# Patient Record
Sex: FEMALE | Race: WHITE | URBAN - METROPOLITAN AREA
[De-identification: names, ages, dates, MRNs, and addresses within clinical notes are randomized per-mention and may not be internally consistent; named-entity substitution may affect disease eponyms.]

---

## 2017-11-05 ENCOUNTER — ED HISTORICAL/CONVERTED ENCOUNTER (OUTPATIENT)
Dept: OTHER | Age: 42
End: 2017-11-05

## 2018-01-23 ENCOUNTER — OFFICE VISIT (OUTPATIENT)
Dept: ENDOCRINOLOGY | Age: 43
End: 2018-01-23

## 2018-01-23 VITALS — HEIGHT: 58 IN | RESPIRATION RATE: 16 BRPM

## 2018-01-23 DIAGNOSIS — E03.8 SUBCLINICAL HYPOTHYROIDISM: Primary | ICD-10-CM

## 2018-01-23 RX ORDER — CLOBAZAM 10 MG/1
10 TABLET ORAL 2 TIMES DAILY
COMMUNITY

## 2018-01-23 RX ORDER — LEVOTHYROXINE SODIUM 50 UG/1
50 TABLET ORAL
COMMUNITY

## 2018-01-23 RX ORDER — ZONISAMIDE 100 MG/1
100 CAPSULE ORAL DAILY
COMMUNITY

## 2018-01-23 RX ORDER — LACOSAMIDE 100 MG/1
100 TABLET ORAL 2 TIMES DAILY
COMMUNITY

## 2018-01-23 RX ORDER — LACTULOSE 10 G/10G
20 SOLUTION ORAL 2 TIMES DAILY
COMMUNITY

## 2018-01-23 RX ORDER — FLUOXETINE HYDROCHLORIDE 40 MG/1
40 CAPSULE ORAL DAILY
COMMUNITY

## 2018-01-23 RX ORDER — TRAMADOL HYDROCHLORIDE 50 MG/1
50 TABLET ORAL
COMMUNITY

## 2018-01-23 RX ORDER — FEXOFENADINE HCL 60 MG
60 TABLET ORAL 2 TIMES DAILY
COMMUNITY

## 2018-01-23 RX ORDER — DOCUSATE SODIUM 100 MG/1
100 CAPSULE, LIQUID FILLED ORAL 2 TIMES DAILY
COMMUNITY

## 2018-01-23 RX ORDER — DIAZEPAM 10 MG/2ML
10 GEL RECTAL AS NEEDED
COMMUNITY

## 2018-01-23 RX ORDER — FERROUS SULFATE, DRIED 160(50) MG
1 TABLET, EXTENDED RELEASE ORAL 2 TIMES DAILY WITH MEALS
COMMUNITY

## 2018-01-23 RX ORDER — QUETIAPINE FUMARATE 25 MG/1
25 TABLET, FILM COATED ORAL
COMMUNITY

## 2018-01-23 RX ORDER — ERGOCALCIFEROL 1.25 MG/1
50000 CAPSULE ORAL
COMMUNITY

## 2018-01-23 RX ORDER — DIVALPROEX SODIUM 500 MG/1
500 TABLET, EXTENDED RELEASE ORAL
COMMUNITY

## 2018-01-23 RX ORDER — FLUOXETINE 20 MG/1
20 TABLET ORAL DAILY
COMMUNITY

## 2018-01-23 RX ORDER — POLYETHYLENE GLYCOL 3350 17 G/17G
17 POWDER, FOR SOLUTION ORAL DAILY
COMMUNITY

## 2018-01-23 RX ORDER — ACETAMINOPHEN 325 MG/1
TABLET ORAL
COMMUNITY

## 2018-01-23 RX ORDER — DIVALPROEX SODIUM 250 MG/1
250 TABLET, EXTENDED RELEASE ORAL DAILY
COMMUNITY

## 2018-01-23 RX ORDER — QUETIAPINE FUMARATE 50 MG/1
50 TABLET, FILM COATED ORAL
COMMUNITY

## 2018-01-23 RX ORDER — ZIPRASIDONE HYDROCHLORIDE 20 MG/1
20 CAPSULE ORAL AS NEEDED
COMMUNITY

## 2018-01-23 RX ORDER — LEVOCARNITINE 330 MG/1
990 TABLET ORAL 3 TIMES DAILY
COMMUNITY

## 2018-01-23 NOTE — PROGRESS NOTES
Gilda Nguyen MD        [address] 78 444 81 [phone] Fax [phone]             Patient Information  Date:1/30/2018  Name : Celeste Boucher 40 y.o.     YOB: 1975         Referred by: Dr Janay Francisco , [phone]        History of present illness    Celeste Boucher is a 40 y.o. female  here for evaluation of thyroid. She was referred by [facility] for evaluation and management of abnormal thyroid function tests. She has underlying cerebral palsy, depression, seizure disorders on medications including Depakote and Seroquel. She was found to have elevated TSH of 8 with low free T4 recently. Prior TSH was normal. Reportedly gaining weight. Patient is noncommunicative and most of the history was from the reports and the caregiver who is with the patient. No known history of any prior thyroid disorders    Patient was agitated during the office visit. Wt Readings from Last 3 Encounters:   No data found for Wt       Past Medical History:   Diagnosis Date    Agitation     Cerebral palsy (HCC)     Depression     Onychomycosis     Osteopenia     Seizure disorder (HCC)        Current Outpatient Prescriptions   Medication Sig    lacosamide (VIMPAT) 100 mg tab tablet Take 100 mg by mouth two (2) times a day.  fexofenadine (ALLEGRA) 60 mg tablet Take 60 mg by mouth two (2) times a day.  polyethylene glycol (MIRALAX) 17 gram/dose powder Take 17 g by mouth daily.  zonisamide (ZONEGRAN) 100 mg capsule Take 100 mg by mouth daily.  calcium-vitamin D (OYSTER SHELL) 500 mg(1,250mg) -200 unit per tablet Take 1 Tab by mouth two (2) times daily (with meals).  docusate sodium (COLACE) 100 mg capsule Take 100 mg by mouth two (2) times a day.  cloBAZam (ONFI) 10 mg tab tablet Take 10 mg by mouth two (2) times a day.  levOCARNitine (CARNITOR) 330 mg tablet Take 990 mg by mouth three (3) times daily.  divalproex ER (DEPAKOTE ER) 500 mg ER tablet Take 500 mg by mouth nightly.  divalproex ER (DEPAKOTE ER) 250 mg ER tablet Take 250 mg by mouth daily.  levothyroxine (SYNTHROID) 50 mcg tablet Take 50 mcg by mouth Daily (before breakfast).  QUEtiapine (SEROQUEL) 25 mg tablet Take 25 mg by mouth nightly.  QUEtiapine (SEROQUEL) 50 mg tablet Take 50 mg by mouth nightly.  FLUoxetine (PROZAC) 20 mg tablet Take 20 mg by mouth daily.  FLUoxetine (PROZAC) 40 mg capsule Take 40 mg by mouth daily.  ergocalciferol (VITAMIN D2) 50,000 unit capsule Take 50,000 Units by mouth every seven (7) days.  lactulose (KRISTALOSE) 10 gram packet Take 20 g by mouth two (2) times a day.  diazePAM (VALIUM) 5-7.5-10 mg kit Insert 10 mg into rectum as needed.  acetaminophen (TYLENOL) 325 mg tablet Take  by mouth every four (4) hours as needed for Pain.  ziprasidone (GEODON) 20 mg capsule Take 20 mg by mouth as needed.  traMADol (ULTRAM) 50 mg tablet Take 50 mg by mouth every six (6) hours as needed for Pain. No current facility-administered medications for this visit. Not on File      Review of Systems:  Unable to obtain due to mental status    Physical Examination:  Resp. rate 16, height 4' 10\" (1.473 m). - General: pleasant, no distress, good eye contact  - HEENT: no exopthalmos,   - Neck: supple, no  thyromegaly, limited exam as she would not open her jacket for me  - Cardiovascular: regular, normal rate, normal S1 and S2,  - Respiratory: clear to auscultation bilaterally  - Gastrointestinal: soft,   - Musculoskeletal: she was stiff   - Integumentary: no tremors,   - Neurological: agitated during the entire visit    - Psychiatric: agitated     Data Reviewed:     [] Reviewed labs      Assessment/Plan:     1. Subclinical hypothyroidism      Subclinical hypothyroidism.   It is very hard to say that weight gain is related to this mild change in the thyroid function test. She is on antipsychotics which can also cause weight gain. Free T4 direct assay can be falsely low due to antiseizure medications also. She has a prior history of normal TSH and this is the first time it has changed. I would rather stop the Levothyroxine and monitor TSH along with free T4 by equilibrium dialysis in 4-6 weeks. If less than 10, no need for any therapy. Also, checking thyroid peroxidase antibody would be helpful. If TSH is more than 10, Levothyroxine therapy can be resumed. If she has positive  thyroxidase antibodies, thyroid function tests can be checked every six to twelve months. Clinically, could not appreciate any goiter. There are no Patient Instructions on file for this visit.   Follow-up Disposition: Not on File        Patient/Care giver  verbalized understanding

## 2018-01-23 NOTE — MR AVS SNAPSHOT
[address] [facility] [address] 
[phone] Patient: Anette Blake MRN: [id number] :1975 Visit Information Date & Time Provider Department Dept. Phone Encounter #  
 2018  2:00 PM Viktoriya Anand MD [facility] [phone] [id number] Allergies as of 2018  Review Complete On: 2018 By: Viktoriya Anand MD  
 Not on File Current Immunizations  Never Reviewed No immunizations on file. Not reviewed this visit You Were Diagnosed With   
  
 Codes Comments Subclinical hypothyroidism    -  Primary ICD-10-CM: E03.9 ICD-9-CM: 244.8 Vitals Resp Height(growth percentile) OB Status Smoking Status 16 4' 10\" (1.473 m) Hysterectomy Never Smoker Vitals History Your Updated Medication List  
  
   
This list is accurate as of: 18  2:31 PM.  Always use your most recent med list.  
  
  
  
  
 calcium-vitamin D 500 mg(1,250mg) -200 unit per tablet Commonly known as:  OYSTER SHELL Take 1 Tab by mouth two (2) times daily (with meals). diazePAM 5-7.5-10 mg Kit Commonly known as:  VALIUM Insert 10 mg into rectum as needed. * divalproex  mg ER tablet Commonly known as:  DEPAKOTE ER Take 500 mg by mouth nightly. * divalproex  mg ER tablet Commonly known as:  DEPAKOTE ER Take 250 mg by mouth daily. docusate sodium 100 mg capsule Commonly known as:  Aloma Dears Take 100 mg by mouth two (2) times a day. fexofenadine 60 mg tablet Commonly known as:  [place] Take 60 mg by mouth two (2) times a day. * FLUoxetine 20 mg tablet Commonly known as:  PROzac Take 20 mg by mouth daily. * FLUoxetine 40 mg capsule Commonly known as:  PROzac Take 40 mg by mouth daily. lactulose 10 gram packet Commonly known as:  Kiersten David Take 20 g by mouth two (2) times a day. levOCARNitine 330 mg tablet Commonly known as:  CARNITOR Take 990 mg by mouth three (3) times daily. levothyroxine 50 mcg tablet Commonly known as:  SYNTHROID Take 50 mcg by mouth Daily (before breakfast). ONFI 10 mg Tab tablet Generic drug:  cloBAZam  
Take 10 mg by mouth two (2) times a day. polyethylene glycol 17 gram/dose powder Commonly known as:  Albina Beau Take 17 g by mouth daily. * QUEtiapine 25 mg tablet Commonly known as:  SEROquel Take 25 mg by mouth nightly. * QUEtiapine 50 mg tablet Commonly known as:  SEROquel Take 50 mg by mouth nightly. traMADol 50 mg tablet Commonly known as:  ULTRAM  
Take 50 mg by mouth every six (6) hours as needed for Pain. TYLENOL 325 mg tablet Generic drug:  acetaminophen Take  by mouth every four (4) hours as needed for Pain. VIMPAT 100 mg Tab tablet Generic drug:  lacosamide Take 100 mg by mouth two (2) times a day. VITAMIN D2 50,000 unit capsule Generic drug:  ergocalciferol Take 50,000 Units by mouth every seven (7) days. ziprasidone 20 mg capsule Commonly known as:  Demetria Jennie Take 20 mg by mouth as needed. zonisamide 100 mg capsule Commonly known as:  Kaushik Stallion Take 100 mg by mouth daily. * Notice: This list has 6 medication(s) that are the same as other medications prescribed for you. Read the directions carefully, and ask your doctor or other care provider to review them with you. Introducing [facility]! [employer] introduces [employer] patient portal. Now you can access parts of your medical record, email your doctor's office, and request medication refills online. 1. In your internet browser, go to [email or web address] 2. Click on the First Time User? Click Here link in the Sign In box. You will see the New Member Sign Up page. 3. Enter your [employer] Access Code exactly as it appears below.  You will not need to use this code after youve completed the sign-up process. If you do not sign up before the expiration date, you must request a new code. · charity: [employer] Access Code: [email or web address] Expires: 4/23/2018  2:31 PM 
 
4. Enter the last four digits of your Social Security Number (xxxx) and Date of Birth (mm/dd/yyyy) as indicated and click Submit. You will be taken to the next sign-up page. 5. Create a [employer] ID. This will be your [employer] login ID and cannot be changed, so think of one that is secure and easy to remember. 6. Create a [employer]: [employer] password. You can change your password at any time. 7. Enter your Password Reset Question and Answer. This can be used at a later time if you forget your password. 8. Enter your e-mail address. You will receive e-mail notification when new information is available in [address]. 9. Click Sign Up. You can now view and download portions of your medical record. 10. Click the Download Summary menu link to download a portable copy of your medical information. If you have questions, please visit the Frequently Asked Questions section of the [employer] website. Remember, [employer] is NOT to be used for urgent needs. For medical emergencies, dial 911. Now available from your iPhone and Android! Please provide this summary of care documentation to your next provider. If you have any questions after today's visit, please call [phone].

## 2018-01-23 NOTE — PROGRESS NOTES
Benjamin Baker is a 40 y.o. female here for   Chief Complaint   Patient presents with    New Patient     referred by Dr. Addie San for Thyroid       1. Have you been to the ER, urgent care clinic since your last visit? Hospitalized since your last visit? -n/a    2. Have you seen or consulted any other health care providers outside of the [address] [place] [address] since your last visit?   Include any pap smears or colon screening.-n/a    Wt Readings from Last 3 Encounters:   No data found for Wt     Temp Readings from Last 3 Encounters:   No data found for Temp     BP Readings from Last 3 Encounters:   No data found for BP     Pulse Readings from Last 3 Encounters:   No data found for Pulse

## 2018-01-28 PROBLEM — E03.8 SUBCLINICAL HYPOTHYROIDISM: Status: ACTIVE | Noted: 2018-01-28

## 2018-01-30 ENCOUNTER — DOCUMENTATION ONLY (OUTPATIENT)
Dept: ENDOCRINOLOGY | Age: 43
End: 2018-01-30